# Patient Record
Sex: MALE | Race: WHITE | NOT HISPANIC OR LATINO | Employment: OTHER | URBAN - METROPOLITAN AREA
[De-identification: names, ages, dates, MRNs, and addresses within clinical notes are randomized per-mention and may not be internally consistent; named-entity substitution may affect disease eponyms.]

---

## 2017-07-26 ENCOUNTER — OFFICE VISIT (OUTPATIENT)
Dept: URGENT CARE | Facility: PHYSICIAN GROUP | Age: 67
End: 2017-07-26

## 2017-07-26 ENCOUNTER — HOSPITAL ENCOUNTER (OUTPATIENT)
Dept: RADIOLOGY | Facility: MEDICAL CENTER | Age: 67
End: 2017-07-26
Attending: PHYSICIAN ASSISTANT

## 2017-07-26 VITALS
BODY MASS INDEX: 20.4 KG/M2 | HEART RATE: 85 BPM | DIASTOLIC BLOOD PRESSURE: 66 MMHG | TEMPERATURE: 98.6 F | OXYGEN SATURATION: 97 % | SYSTOLIC BLOOD PRESSURE: 104 MMHG | WEIGHT: 130 LBS | HEIGHT: 67 IN
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
## 2017-07-26 DIAGNOSIS — R07.81 RIB PAIN ON RIGHT SIDE: ICD-10-CM

## 2017-07-26 DIAGNOSIS — S22.31XA CLOSED FRACTURE OF ONE RIB OF RIGHT SIDE, INITIAL ENCOUNTER: ICD-10-CM

## 2017-07-26 PROCEDURE — 99203 OFFICE O/P NEW LOW 30 MIN: CPT | Performed by: PHYSICIAN ASSISTANT

## 2017-07-26 PROCEDURE — 71101 X-RAY EXAM UNILAT RIBS/CHEST: CPT | Mod: RT

## 2017-07-26 RX ORDER — ROPINIROLE 2 MG/1
2 TABLET, FILM COATED ORAL NIGHTLY
COMMUNITY

## 2017-07-26 RX ORDER — TRIHEXYPHENIDYL HYDROCHLORIDE 2 MG/1
2 TABLET ORAL 3 TIMES DAILY
COMMUNITY

## 2017-07-26 ASSESSMENT — ENCOUNTER SYMPTOMS
PALPITATIONS: 0
FALLS: 1
BACK PAIN: 0
GASTROINTESTINAL NEGATIVE: 1
CHILLS: 0
LOSS OF CONSCIOUSNESS: 0
DIZZINESS: 0
SHORTNESS OF BREATH: 0
COUGH: 0
WHEEZING: 0
NECK PAIN: 0
FEVER: 0

## 2017-07-26 NOTE — Clinical Note
July 27, 2017         Patient: Andrzej Tsang   YOB: 1950   Date of Visit: 7/26/2017           To Whom it May Concern:    Andrzej Tsang was seen in my clinic on 7/26/2017. Andrzej has been diagnosed with a nondisplaced right lateral eighth or ninth rib fracture.  If you have any questions or concerns, please don't hesitate to call.        Sincerely,         Jairon Keith PA-C  Electronically Signed

## 2017-07-27 NOTE — PROGRESS NOTES
Subjective:      Andrzej Tsang is a 66 y.o. male who presents with Rib Injury            Rib Injury  This is a new problem. The current episode started today. The problem occurs constantly. The problem has been unchanged. Pertinent negatives include no chest pain, chills, coughing, fever or neck pain. The symptoms are aggravated by coughing. He has tried nothing for the symptoms. The treatment provided no relief.   Patient slipped in the bathtub and landed on his right rib. He did not hit his head, did not lose consciousness. He denies abdominal pain, hematuria, dizziness, headache. Right-sided rib pain.    PMH:  has no past medical history on file.  MEDS:   Current outpatient prescriptions:   •  ropinirole (REQUIP) 2 MG tablet, Take 2 mg by mouth every evening., Disp: , Rfl:   •  CARBIDOPA PO, Take  by mouth., Disp: , Rfl:   •  CLONAZEPAM PO, Take  by mouth., Disp: , Rfl:   •  trihexyphenidyl (ARTANE) 2 MG Tab, Take 2 mg by mouth 3 times a day., Disp: , Rfl:   •  DROXIDOPA PO, Take  by mouth., Disp: , Rfl:   ALLERGIES:   Allergies   Allergen Reactions   • [place] Flavor      SURGHX: History reviewed. No pertinent past surgical history.  SOCHX:    FH: family history is not on file.      Review of Systems   Constitutional: Negative for fever, chills and malaise/fatigue.   HENT: Negative.    Respiratory: Negative for cough, shortness of breath and wheezing.    Cardiovascular: Negative for chest pain, palpitations and leg swelling.   Gastrointestinal: Negative.    Genitourinary: Negative.    Musculoskeletal: Positive for falls. Negative for back pain and neck pain.        Right lateral rib pain   Neurological: Negative for dizziness and loss of consciousness.   All other systems reviewed and are negative.      Medications, Allergies, and current problem list reviewed today in Epic  Family history reviewed with patient and is not pertinent for today's visit     Objective:     /66 mmHg  Pulse 85  Temp(Src) 37 °C  "(98.6 °F)  Ht 1.702 m (5' 7\")  Wt 58.968 kg (130 lb)  BMI 20.36 kg/m2  SpO2 97%     Physical Exam   Constitutional: He is oriented to person, place, and time. He appears well-developed and well-nourished. No distress.   HENT:   Head: Normocephalic and atraumatic.   Right Ear: External ear normal.   Left Ear: External ear normal.   Eyes: Conjunctivae and EOM are normal. Right eye exhibits no discharge. Left eye exhibits no discharge.   Neck: Normal range of motion. Neck supple.   Cardiovascular: Normal rate, regular rhythm and normal heart sounds.    No murmur heard.  Pulmonary/Chest: Effort normal and breath sounds normal. No respiratory distress. He has no wheezes. Chest wall is not dull to percussion. He exhibits tenderness, bony tenderness, edema and swelling. He exhibits no deformity.       Abdominal: Soft. He exhibits no distension. There is no tenderness.   Neurological: He is alert and oriented to person, place, and time.   Skin: Skin is warm and dry. He is not diaphoretic.   Psychiatric: He has a normal mood and affect. His behavior is normal. Judgment and thought content normal.   Nursing note and vitals reviewed.         Xray: Reviewed by me. Radiology review pending.     Assessment/Plan:     1. Fall, initial encounter  [id number] (WITH 1-VIEW CXR) RIGHT   2. Rib pain on right side  [id number] (WITH 1-VIEW CXR) RIGHT   3. Closed fracture of one rib of right side, initial encounter       Nondisplaced fracture of the eighth or ninth rib. No signs of pneumothorax. PO2 97%, no breathing issues.  Encourage deep breathing hourly  OTC meds and conservative measures as discussed  Return to clinic or go to ED if symptoms worsen or persist. Indications for ED discussed at length. Patient voices understanding. Follow-up with your primary care provider in 3-5 days. Red flags discussed.    Please note that this dictation was created using voice recognition software. I have made every reasonable " attempt to correct obvious errors, but I expect that there are errors of grammar and possibly content that I did not discover before finalizing the note.

## 2021-03-03 DIAGNOSIS — Z23 NEED FOR VACCINATION: ICD-10-CM
